# Patient Record
Sex: FEMALE | Race: BLACK OR AFRICAN AMERICAN | NOT HISPANIC OR LATINO | Employment: FULL TIME | URBAN - METROPOLITAN AREA
[De-identification: names, ages, dates, MRNs, and addresses within clinical notes are randomized per-mention and may not be internally consistent; named-entity substitution may affect disease eponyms.]

---

## 2023-04-20 PROBLEM — G89.29 CHRONIC BILATERAL LOW BACK PAIN WITH BILATERAL SCIATICA: Status: ACTIVE | Noted: 2023-04-20

## 2023-04-20 PROBLEM — R79.89 LOW VITAMIN D LEVEL: Status: ACTIVE | Noted: 2023-04-20

## 2023-04-20 PROBLEM — R73.03 PREDIABETES: Status: ACTIVE | Noted: 2023-04-20

## 2023-04-20 PROBLEM — G56.91 NEUROPATHY OF RIGHT UPPER EXTREMITY: Status: ACTIVE | Noted: 2023-04-20

## 2023-04-20 PROBLEM — M54.2 SORE NECK: Status: ACTIVE | Noted: 2023-04-20

## 2023-04-20 PROBLEM — E87.8 ELECTROLYTE ABNORMALITY: Status: ACTIVE | Noted: 2023-04-20

## 2023-04-20 PROBLEM — M54.41 CHRONIC BILATERAL LOW BACK PAIN WITH BILATERAL SCIATICA: Status: ACTIVE | Noted: 2023-04-20

## 2023-04-20 PROBLEM — E88.810 METABOLIC SYNDROME: Status: ACTIVE | Noted: 2023-04-20

## 2023-04-20 PROBLEM — M47.812 DEGENERATIVE ARTHRITIS OF CERVICAL SPINE: Status: ACTIVE | Noted: 2023-04-20

## 2023-04-20 PROBLEM — E78.5 HYPERLIPIDEMIA: Status: ACTIVE | Noted: 2023-04-20

## 2023-04-20 PROBLEM — E66.9 OBESITY: Status: ACTIVE | Noted: 2023-04-20

## 2023-04-20 PROBLEM — J45.909 ASTHMA (HHS-HCC): Status: ACTIVE | Noted: 2023-04-20

## 2023-04-20 PROBLEM — M54.42 CHRONIC BILATERAL LOW BACK PAIN WITH BILATERAL SCIATICA: Status: ACTIVE | Noted: 2023-04-20

## 2023-04-20 PROBLEM — R63.2 POLYPHAGIA: Status: ACTIVE | Noted: 2023-04-20

## 2023-04-20 PROBLEM — E53.8 B12 DEFICIENCY: Status: ACTIVE | Noted: 2023-04-20

## 2023-04-20 PROBLEM — L85.3 DRY SKIN: Status: ACTIVE | Noted: 2023-04-20

## 2023-04-20 PROBLEM — R53.83 FATIGUE: Status: ACTIVE | Noted: 2023-04-20

## 2023-04-20 PROBLEM — M47.816 SPONDYLOSIS OF LUMBAR JOINT: Status: ACTIVE | Noted: 2023-04-20

## 2023-04-20 PROBLEM — M43.02 SPONDYLOLYSIS OF CERVICAL REGION: Status: ACTIVE | Noted: 2023-04-20

## 2023-04-20 PROBLEM — M19.011 OSTEOARTHRITIS OF RIGHT SHOULDER: Status: ACTIVE | Noted: 2023-04-20

## 2023-04-20 PROBLEM — F17.210 CIGARETTE NICOTINE DEPENDENCE WITHOUT COMPLICATION: Status: ACTIVE | Noted: 2023-04-20

## 2023-04-20 RX ORDER — NICOTINE POLACRILEX 2 MG
1 GUM BUCCAL
COMMUNITY
End: 2023-06-07 | Stop reason: ALTCHOICE

## 2023-04-20 RX ORDER — ACETAMINOPHEN 500 MG
1 TABLET ORAL
COMMUNITY
Start: 2020-10-01 | End: 2023-06-07 | Stop reason: SDUPTHER

## 2023-04-20 RX ORDER — ALBUTEROL SULFATE 90 UG/1
1 AEROSOL, METERED RESPIRATORY (INHALATION) EVERY 4 HOURS PRN
COMMUNITY
Start: 2020-09-29 | End: 2023-06-07 | Stop reason: SDUPTHER

## 2023-04-20 RX ORDER — TOPIRAMATE 25 MG/1
25 TABLET ORAL
COMMUNITY
Start: 2021-10-19 | End: 2023-06-07 | Stop reason: ALTCHOICE

## 2023-04-20 RX ORDER — FLUTICASONE PROPIONATE 44 UG/1
1 AEROSOL, METERED RESPIRATORY (INHALATION)
COMMUNITY
Start: 2020-10-14 | End: 2023-06-07 | Stop reason: SDUPTHER

## 2023-04-20 RX ORDER — NAPROXEN SODIUM 220 MG/1
81 TABLET, FILM COATED ORAL
COMMUNITY
Start: 2020-09-16

## 2023-04-20 RX ORDER — METFORMIN HYDROCHLORIDE 500 MG/1
500 TABLET, EXTENDED RELEASE ORAL DAILY
COMMUNITY
Start: 2021-10-20 | End: 2023-06-07 | Stop reason: ALTCHOICE

## 2023-06-07 ENCOUNTER — OFFICE VISIT (OUTPATIENT)
Dept: PRIMARY CARE | Facility: CLINIC | Age: 53
End: 2023-06-07
Payer: MEDICAID

## 2023-06-07 VITALS — DIASTOLIC BLOOD PRESSURE: 98 MMHG | SYSTOLIC BLOOD PRESSURE: 130 MMHG | WEIGHT: 256 LBS | BODY MASS INDEX: 43.26 KG/M2

## 2023-06-07 DIAGNOSIS — T78.40XD ALLERGY, SUBSEQUENT ENCOUNTER: ICD-10-CM

## 2023-06-07 DIAGNOSIS — E66.9 OBESITY, UNSPECIFIED CLASSIFICATION, UNSPECIFIED OBESITY TYPE, UNSPECIFIED WHETHER SERIOUS COMORBIDITY PRESENT: ICD-10-CM

## 2023-06-07 DIAGNOSIS — E55.9 VITAMIN D DEFICIENCY: ICD-10-CM

## 2023-06-07 DIAGNOSIS — Z12.4 CERVICAL CANCER SCREENING: ICD-10-CM

## 2023-06-07 DIAGNOSIS — J45.909 ASTHMA, UNSPECIFIED ASTHMA SEVERITY, UNSPECIFIED WHETHER COMPLICATED, UNSPECIFIED WHETHER PERSISTENT (HHS-HCC): ICD-10-CM

## 2023-06-07 DIAGNOSIS — F17.210 CIGARETTE NICOTINE DEPENDENCE WITHOUT COMPLICATION: Primary | ICD-10-CM

## 2023-06-07 DIAGNOSIS — R73.03 PREDIABETES: ICD-10-CM

## 2023-06-07 PROCEDURE — 99214 OFFICE O/P EST MOD 30 MIN: CPT | Performed by: INTERNAL MEDICINE

## 2023-06-07 RX ORDER — FLUTICASONE PROPIONATE 44 UG/1
2 AEROSOL, METERED RESPIRATORY (INHALATION)
Qty: 10.6 G | Refills: 3 | Status: SHIPPED | OUTPATIENT
Start: 2023-06-07 | End: 2023-10-10 | Stop reason: SDUPTHER

## 2023-06-07 RX ORDER — ACETAMINOPHEN 500 MG
5000 TABLET ORAL DAILY
Qty: 30 TABLET | Refills: 3 | Status: SHIPPED | OUTPATIENT
Start: 2023-06-07

## 2023-06-07 RX ORDER — VARENICLINE TARTRATE 0.5 MG/1
0.5 TABLET, FILM COATED ORAL 2 TIMES DAILY
Qty: 60 TABLET | Refills: 2 | Status: SHIPPED | OUTPATIENT
Start: 2023-06-07 | End: 2023-10-09 | Stop reason: SDUPTHER

## 2023-06-07 RX ORDER — ALBUTEROL SULFATE 90 UG/1
1 AEROSOL, METERED RESPIRATORY (INHALATION) EVERY 4 HOURS PRN
Qty: 18 G | Refills: 3 | Status: SHIPPED | OUTPATIENT
Start: 2023-06-07 | End: 2023-10-10 | Stop reason: SDUPTHER

## 2023-06-07 ASSESSMENT — ENCOUNTER SYMPTOMS
NAUSEA: 0
DIFFICULTY URINATING: 0
ACTIVITY CHANGE: 0
SHORTNESS OF BREATH: 0
WOUND: 0
SLEEP DISTURBANCE: 0
NECK STIFFNESS: 0
PALPITATIONS: 0
VOMITING: 0
COUGH: 0
APPETITE CHANGE: 0
NECK PAIN: 0
CHEST TIGHTNESS: 0
BACK PAIN: 0
FLANK PAIN: 0
FATIGUE: 0
NERVOUS/ANXIOUS: 0
WHEEZING: 0
BLOOD IN STOOL: 0
MYALGIAS: 0
DIAPHORESIS: 0
HEADACHES: 0
DIZZINESS: 0
CHILLS: 0
APNEA: 0
WEAKNESS: 0
TREMORS: 0
DYSURIA: 0
ARTHRALGIAS: 0
HEMATURIA: 0
FEVER: 0
ABDOMINAL DISTENTION: 0
FREQUENCY: 0
JOINT SWELLING: 0
CONSTIPATION: 0
ABDOMINAL PAIN: 0
UNEXPECTED WEIGHT CHANGE: 0
DIARRHEA: 0

## 2023-06-07 ASSESSMENT — PATIENT HEALTH QUESTIONNAIRE - PHQ9
SUM OF ALL RESPONSES TO PHQ9 QUESTIONS 1 AND 2: 0
2. FEELING DOWN, DEPRESSED OR HOPELESS: NOT AT ALL
1. LITTLE INTEREST OR PLEASURE IN DOING THINGS: NOT AT ALL

## 2023-06-07 NOTE — PROGRESS NOTES
Subjective   Patient ID: Violet Torrez is a 53 y.o. female who presents for Follow-up.  Violet Torrez is a 52 yo F with PMH of Pre-Diabetes, HLD, chronic smoking, and Asthma came to the clinic for a routine visit and medication refills.  Patient feels well overall but really wants to loose wait as it seems like its hard. She was previously on topiramate for apetite suppresiion and metformin but could not tolerate. She is trying to exercise and have a healthy diet. She is also trying to quit smoking. She did hear about Ozempic and trulicity as they been helping with weight loss and have contacted her insurance and they said for trulicity she needs pre authorization but not for ozempic.    Patient denies fever, chills, recent illness, CP, SOB, palpitations, abdominal pain, N/V, diarrhea, melena, hematochezia, dysuria, hematuria, or leg swelling.             Review of Systems   Constitutional:  Negative for activity change, appetite change, chills, diaphoresis, fatigue, fever and unexpected weight change.   Eyes:  Negative for visual disturbance.   Respiratory:  Negative for apnea, cough, chest tightness, shortness of breath and wheezing.    Cardiovascular:  Negative for chest pain, palpitations and leg swelling.   Gastrointestinal:  Negative for abdominal distention, abdominal pain, blood in stool, constipation, diarrhea, nausea and vomiting.   Endocrine: Negative for cold intolerance and heat intolerance.   Genitourinary:  Negative for decreased urine volume, difficulty urinating, dysuria, enuresis, flank pain, frequency, hematuria and urgency.   Musculoskeletal:  Negative for arthralgias, back pain, gait problem, joint swelling, myalgias, neck pain and neck stiffness.   Skin:  Negative for rash and wound.   Neurological:  Negative for dizziness, tremors, syncope, weakness and headaches.   Psychiatric/Behavioral:  Negative for behavioral problems and sleep disturbance. The patient is not nervous/anxious.         Objective   Physical Exam  Constitutional:       General: She is not in acute distress.     Appearance: She is obese. She is not ill-appearing, toxic-appearing or diaphoretic.   HENT:      Head: Normocephalic and atraumatic.      Right Ear: There is no impacted cerumen.      Left Ear: There is no impacted cerumen.      Nose: Nose normal. No congestion or rhinorrhea.      Mouth/Throat:      Mouth: Mucous membranes are moist.      Pharynx: No oropharyngeal exudate or posterior oropharyngeal erythema.   Eyes:      General:         Right eye: No discharge.         Left eye: No discharge.      Extraocular Movements: Extraocular movements intact.      Conjunctiva/sclera: Conjunctivae normal.      Pupils: Pupils are equal, round, and reactive to light.   Neck:      Vascular: No carotid bruit.   Cardiovascular:      Rate and Rhythm: Normal rate and regular rhythm.      Heart sounds: No murmur heard.     No friction rub. No gallop.   Pulmonary:      Effort: No respiratory distress.      Breath sounds: No stridor. No wheezing, rhonchi or rales.   Chest:      Chest wall: No tenderness.   Abdominal:      General: Abdomen is flat. Bowel sounds are normal. There is no distension.      Palpations: Abdomen is soft. There is no mass.      Tenderness: There is no abdominal tenderness. There is no guarding or rebound.      Hernia: No hernia is present.   Musculoskeletal:         General: No swelling, tenderness, deformity or signs of injury. Normal range of motion.      Cervical back: Normal range of motion and neck supple. No rigidity or tenderness.      Right lower leg: No edema.      Left lower leg: No edema.   Lymphadenopathy:      Cervical: No cervical adenopathy.   Skin:     Coloration: Skin is not jaundiced or pale.      Findings: No bruising, erythema, lesion or rash.   Neurological:      General: No focal deficit present.      Mental Status: She is oriented to person, place, and time. Mental status is at baseline.       Cranial Nerves: No cranial nerve deficit.      Sensory: No sensory deficit.      Motor: No weakness.      Coordination: Coordination normal.      Gait: Gait normal.      Deep Tendon Reflexes: Reflexes normal.   Psychiatric:         Mood and Affect: Mood normal.         Behavior: Behavior normal.         Thought Content: Thought content normal.         Judgment: Judgment normal.         Assessment/Plan   Problem List Items Addressed This Visit          Respiratory    Asthma    Relevant Medications    fluticasone (Flovent) 44 mcg/actuation inhaler    albuterol 90 mcg/actuation inhaler       Endocrine/Metabolic    Obesity    Relevant Medications    semaglutide 0.25 mg or 0.5 mg (2 mg/3 mL) pen injector    Other Relevant Orders    Hemoglobin A1C    Referral to Nutrition Services    Referral to Comprehensive Weight Loss    Prediabetes    Relevant Medications    semaglutide 0.25 mg or 0.5 mg (2 mg/3 mL) pen injector       Other    Cigarette nicotine dependence without complication - Primary    Relevant Medications    varenicline (Chantix) 0.5 mg tablet     Other Visit Diagnoses       Allergy, subsequent encounter        Relevant Medications    naphazoline (Clear Eyes) 0.012-0.25 % ophthalmic solution    Vitamin D deficiency        Relevant Medications    cholecalciferol (Vitamin D-3) 5,000 Units tablet    Cervical cancer screening        Relevant Orders    Referral to Obstetrics / Gynecology            Violet Atkinsonod is a 54 yo F with PMH of Pre-Diabetes, HLD, chronic smoking, and Asthma came to the clinic for a routine visit and medication refills.    #Elevated BP   -BP today 130/98  -Advised to check BP at home and let us know   -Advised on weight loss / diet exercise / smoking cessation  -Will follow and monitor     #Pre-diabetes  -Last HbA1C 6, will repeat today  -Metformin discontinued / patient could not tolerate  -Ozempic 0.5mg once a week if patient insurance covered      #HLD  -Continue atorvastatin 80 mg  daily    #Asthma  -Well controlled   -Continue Ventolin and Flovent    #Obesity  -Weight loss program referral and nutrition referral today  -Advised on diet and exercise with additional marlonn brochures and literature   -Start Ozempic 0.5mg weekly if covered   -Was previoulsy on metformin and topiramate but could not tolerate     #Chronic smoker   -Patient smokes 6 cig per day   -Used to smoke 1pack a day ( 15 years )  -Rx Chantix as the patient is willing to quit       #Health Maintenance  -LABS completed 02/2023   -Continue Vitamin D3 5000U QD  -Cologuard: Negative on 02/23   -Mammogram: UTD, repeat in 09/2023   -Pap smear: OBGYN referral   -Immunization: ALL due except Tdap (2017), recommended vaccination to patient     RTC in 3 months

## 2023-06-07 NOTE — PROGRESS NOTES
I reviewed with the resident the medical history and the resident’s findings on physical examination.  I discussed with the resident the patient’s diagnosis and concur with the treatment plan as documented in the resident note.     I saw and evaluated the patient. I personally obtained the key and critical portions of the history and physical exam or was physically present for key and critical portions performed by the trainee. I reviewed the trainee's documentation and discussed the patient with the trainee. I agree with the trainee's medical decision making, as documented on the trainee's note.     Michelle Miramontes MD

## 2023-06-09 ENCOUNTER — APPOINTMENT (OUTPATIENT)
Dept: PRIMARY CARE | Facility: CLINIC | Age: 53
End: 2023-06-09
Payer: MEDICAID

## 2023-07-13 ENCOUNTER — LAB (OUTPATIENT)
Dept: LAB | Facility: LAB | Age: 53
End: 2023-07-13
Payer: MEDICAID

## 2023-07-13 DIAGNOSIS — E66.9 OBESITY, UNSPECIFIED CLASSIFICATION, UNSPECIFIED OBESITY TYPE, UNSPECIFIED WHETHER SERIOUS COMORBIDITY PRESENT: ICD-10-CM

## 2023-07-13 PROCEDURE — 36415 COLL VENOUS BLD VENIPUNCTURE: CPT

## 2023-07-13 PROCEDURE — 83036 HEMOGLOBIN GLYCOSYLATED A1C: CPT

## 2023-07-14 LAB
ESTIMATED AVERAGE GLUCOSE FOR HBA1C: 120 MG/DL
HEMOGLOBIN A1C/HEMOGLOBIN TOTAL IN BLOOD: 5.8 %

## 2023-09-18 ENCOUNTER — TELEMEDICINE (OUTPATIENT)
Dept: PRIMARY CARE | Facility: CLINIC | Age: 53
End: 2023-09-18
Payer: MEDICAID

## 2023-09-18 DIAGNOSIS — U07.1 COVID-19: Primary | ICD-10-CM

## 2023-09-18 PROCEDURE — 99214 OFFICE O/P EST MOD 30 MIN: CPT | Performed by: INTERNAL MEDICINE

## 2023-09-18 RX ORDER — BENZONATATE 100 MG/1
100 CAPSULE ORAL 3 TIMES DAILY PRN
Qty: 42 CAPSULE | Refills: 0 | Status: SHIPPED | OUTPATIENT
Start: 2023-09-18 | End: 2023-10-18

## 2023-09-18 RX ORDER — AZITHROMYCIN 250 MG/1
TABLET, FILM COATED ORAL
Qty: 6 TABLET | Refills: 0 | Status: SHIPPED | OUTPATIENT
Start: 2023-09-18 | End: 2023-09-23

## 2023-09-18 RX ORDER — PREDNISONE 20 MG/1
40 TABLET ORAL DAILY
Qty: 10 TABLET | Refills: 0 | Status: SHIPPED | OUTPATIENT
Start: 2023-09-18 | End: 2023-09-23

## 2023-09-18 ASSESSMENT — ENCOUNTER SYMPTOMS
SHORTNESS OF BREATH: 1
CHILLS: 0
FEVER: 0
FACIAL SWELLING: 1
PALPITATIONS: 0
WHEEZING: 0
COUGH: 1

## 2023-09-18 NOTE — PROGRESS NOTES
Subjective   Patient ID: Violet Torrez is a 53 y.o. female who presents for Covid-19 Home Monitoring Visit.  HPI    Review of Systems   Constitutional:  Negative for chills and fever.   HENT:  Positive for congestion and facial swelling.    Respiratory:  Positive for cough and shortness of breath. Negative for wheezing.    Cardiovascular:  Negative for chest pain, palpitations and leg swelling.       Objective   Physical Exam  Neurological:      Mental Status: She is alert.   Psychiatric:         Mood and Affect: Mood normal.         Assessment/Plan   Problem List Items Addressed This Visit       COVID-19 - Primary    Relevant Medications    azithromycin (Zithromax) 250 mg tablet    benzonatate (Tessalon) 100 mg capsule    predniSONE (Deltasone) 20 mg tablet        COVID infection  Has moderate cough, no sputum production.  No alarming symptoms.  Supportive care with fluids, vitamins and cough medications.  Start Zithromax, likely bacterial superinfection  Start Tessalon pearls for cough  If SOB is not better in 48hrs, add Prednisone    OK to RTW when fever free without medications for 24 hrs. Use mask at work if still having cough and congestion.  Let us know if symptoms are not better in 1 week.  Go to ER with any alarming symptoms, such as chest pain, SOB, dizziness, high fever.    An interactive audio and video telecommunication system which permits real time communications between the patient (at the originating site) and provider (at the distant site) was utilized to provide this telehealth service.    Verbal consent was requested and obtained from the patient on the day of encounter.    Michelle Miramontes MD

## 2023-10-09 ENCOUNTER — OFFICE VISIT (OUTPATIENT)
Dept: PRIMARY CARE | Facility: CLINIC | Age: 53
End: 2023-10-09
Payer: MEDICAID

## 2023-10-09 VITALS — SYSTOLIC BLOOD PRESSURE: 133 MMHG | DIASTOLIC BLOOD PRESSURE: 87 MMHG | BODY MASS INDEX: 42.59 KG/M2 | WEIGHT: 252 LBS

## 2023-10-09 DIAGNOSIS — R35.0 URINARY FREQUENCY: ICD-10-CM

## 2023-10-09 DIAGNOSIS — Z12.31 BREAST CANCER SCREENING BY MAMMOGRAM: ICD-10-CM

## 2023-10-09 DIAGNOSIS — E66.01 CLASS 3 SEVERE OBESITY WITHOUT SERIOUS COMORBIDITY WITH BODY MASS INDEX (BMI) OF 40.0 TO 44.9 IN ADULT, UNSPECIFIED OBESITY TYPE (MULTI): ICD-10-CM

## 2023-10-09 DIAGNOSIS — E78.5 HYPERLIPIDEMIA, UNSPECIFIED HYPERLIPIDEMIA TYPE: Primary | ICD-10-CM

## 2023-10-09 DIAGNOSIS — F17.210 CIGARETTE NICOTINE DEPENDENCE WITHOUT COMPLICATION: ICD-10-CM

## 2023-10-09 DIAGNOSIS — J45.909 ASTHMA, UNSPECIFIED ASTHMA SEVERITY, UNSPECIFIED WHETHER COMPLICATED, UNSPECIFIED WHETHER PERSISTENT (HHS-HCC): ICD-10-CM

## 2023-10-09 DIAGNOSIS — R05.2 SUBACUTE COUGH: ICD-10-CM

## 2023-10-09 DIAGNOSIS — R79.89 LOW VITAMIN D LEVEL: ICD-10-CM

## 2023-10-09 DIAGNOSIS — L72.0 EPIDERMAL CYST OF FACE: ICD-10-CM

## 2023-10-09 LAB
POC APPEARANCE, URINE: CLEAR
POC BILIRUBIN, URINE: NEGATIVE
POC BLOOD, URINE: NEGATIVE
POC COLOR, URINE: YELLOW
POC GLUCOSE, URINE: NEGATIVE MG/DL
POC KETONES, URINE: NEGATIVE MG/DL
POC LEUKOCYTES, URINE: NEGATIVE
POC NITRITE,URINE: NEGATIVE
POC PH, URINE: 6 PH
POC PROTEIN, URINE: NEGATIVE MG/DL
POC SPECIFIC GRAVITY, URINE: 1.01
POC UROBILINOGEN, URINE: 0.2 EU/DL

## 2023-10-09 PROCEDURE — 81002 URINALYSIS NONAUTO W/O SCOPE: CPT | Performed by: INTERNAL MEDICINE

## 2023-10-09 PROCEDURE — 99214 OFFICE O/P EST MOD 30 MIN: CPT | Performed by: INTERNAL MEDICINE

## 2023-10-09 PROCEDURE — 99396 PREV VISIT EST AGE 40-64: CPT | Performed by: INTERNAL MEDICINE

## 2023-10-09 PROCEDURE — 3008F BODY MASS INDEX DOCD: CPT | Performed by: INTERNAL MEDICINE

## 2023-10-09 RX ORDER — PREDNISONE 20 MG/1
20 TABLET ORAL 2 TIMES DAILY
Qty: 10 TABLET | Refills: 0 | Status: SHIPPED | OUTPATIENT
Start: 2023-10-09 | End: 2023-10-14

## 2023-10-09 RX ORDER — ERGOCALCIFEROL 1.25 MG/1
50000 CAPSULE ORAL
Qty: 4 CAPSULE | Refills: 1 | Status: SHIPPED | OUTPATIENT
Start: 2023-10-09 | End: 2023-12-04

## 2023-10-09 RX ORDER — ATORVASTATIN CALCIUM 40 MG/1
40 TABLET, FILM COATED ORAL DAILY
Qty: 30 TABLET | Refills: 5 | Status: SHIPPED | OUTPATIENT
Start: 2023-10-09 | End: 2024-04-06

## 2023-10-09 RX ORDER — VARENICLINE TARTRATE 0.5 MG/1
0.5 TABLET, FILM COATED ORAL 2 TIMES DAILY
Qty: 60 TABLET | Refills: 2 | Status: SHIPPED | OUTPATIENT
Start: 2023-10-09 | End: 2024-01-07

## 2023-10-09 NOTE — PROGRESS NOTES
I saw and evaluated the patient. I personally obtained the key and critical portions of the history and physical exam or was physically present for key and critical portions performed by the resident/fellow. I reviewed the resident/fellow's documentation and discussed the patient with the resident/fellow. I agree with the resident/fellow's medical decision making as documented in the note.    Michelle Miramontes MD

## 2023-10-09 NOTE — PROGRESS NOTES
Subjective   Patient ID: Violet Torrez is a 53 y.o. female who presents for No chief complaint on file..  Violet Torrez is a 52 yo F with PMH of Pre-Diabetes, HLD, chronic smoking, and Asthma came to the clinic for a routine visit and medication  here for CPE   last seen 6/23, had covid last month on 9/18. took azithromax but did not take steroids    still has a cough, mild clear sputum productions, dry/eyes mouth, ear fullness   sometimes has shortnessof breath, no chest pain or palpitations  says is eating and drinking okay  has urinary frequency         Review of Systems   All other systems reviewed and are negative.      Objective   Physical Exam  Vitals reviewed.   Constitutional:       Appearance: Normal appearance.   Cardiovascular:      Rate and Rhythm: Normal rate and regular rhythm.   Pulmonary:      Effort: Pulmonary effort is normal.      Breath sounds: Normal breath sounds.   Abdominal:      General: Abdomen is flat. Bowel sounds are normal.      Palpations: Abdomen is soft.   Neurological:      Mental Status: She is alert.   Psychiatric:         Mood and Affect: Mood normal.         Behavior: Behavior normal.       /87   Wt 114 kg (252 lb)   BMI 42.59 kg/m²      Assessment/Plan   Problem List Items Addressed This Visit       Asthma    Cigarette nicotine dependence without complication    Hyperlipidemia - Primary    Low vitamin D level    Obesity     Other Visit Diagnoses       Urinary frequency        UA clean   follow up on sxs on OBGYN referral for pap    Subacute cough        likely related acute bronchitis sp recent COVID   start predisone 40mg x 5days   tessalon pearls for sxs    Epidermal cyst of face        referal to derm    Breast cancer screening by mammogram              #Health Maintenance  -LABS UTD   -Continue Vitamin D3 5000U QD  -ASCVD 6%  -low dose CT ordered   -Cologuard: Negative on 02/23   -Mammogram: ordered today   -Pap smear: OBGYN referral given last visit     -Immunization: needs shingles vaccine     RTC in 4 months

## 2023-10-10 RX ORDER — ALBUTEROL SULFATE 90 UG/1
1 AEROSOL, METERED RESPIRATORY (INHALATION) EVERY 4 HOURS PRN
Qty: 18 G | Refills: 3 | Status: SHIPPED | OUTPATIENT
Start: 2023-10-10

## 2023-10-10 RX ORDER — FLUTICASONE PROPIONATE 44 UG/1
2 AEROSOL, METERED RESPIRATORY (INHALATION)
Qty: 10.6 G | Refills: 3 | Status: SHIPPED | OUTPATIENT
Start: 2023-10-10

## 2023-10-13 ENCOUNTER — APPOINTMENT (OUTPATIENT)
Dept: RADIOLOGY | Facility: CLINIC | Age: 53
End: 2023-10-13
Payer: MEDICAID

## 2023-10-23 NOTE — PROGRESS NOTES
Subjective   Patient ID: Violet Torrez is a 53 y.o. female referral from PCP Dr. Michelle Fox.    HPI: Patient is a 53 year old with past medical history of morbid obesity, HLD, asthma, chronic smoking and prediabetes. Patient is presenting with a lymp on her forehead just above her left eyebrow. Patient denies pain to the area but states she is very uncomfortable with the way it looks. States she would like it removed as soon as possible. States she has had a partial hysterectomy and healed well without excess scarring. Denies constitutional symptoms.     Surgical History: partial hysterectomy  Social History: admits to smoking but is trying to quit.    Review of Systems  10 point review of systems negative except as stated in HPI.     Objective   Physical Exam      Sightly mobile, painless mass located just superior to medial aspect of left eyebrow. Mass does not have a head and is not located on a skin line. No erythema, no drainage, no calor, no malodor, no ascending lymphangitis.     Assessment/Plan   Diagnoses and all orders for this visit:  Class 3 severe obesity without serious comorbidity with body mass index (BMI) of 40.0 to 44.9 in adult, unspecified obesity type (CMS/HCC)  Cigarette nicotine dependence without complication  Epidermal cyst of face  Patient with lipoma vs epidermal cyst to forehead.   Discussed potential risks of removal of cyst including scarring, indentation of the skin, potential for the are to look worse after removal.   Will discuss case with Dr. Delaney and contact patient within one week to discuss treatment options.   Follow up after discussing case with Dr. Delaney.

## 2023-10-26 ENCOUNTER — OFFICE VISIT (OUTPATIENT)
Dept: PLASTIC SURGERY | Facility: CLINIC | Age: 53
End: 2023-10-26
Payer: MEDICAID

## 2023-10-26 VITALS — BODY MASS INDEX: 41.52 KG/M2 | HEIGHT: 65 IN | WEIGHT: 249.2 LBS

## 2023-10-26 DIAGNOSIS — L72.0 EPIDERMAL CYST OF FACE: ICD-10-CM

## 2023-10-26 DIAGNOSIS — F17.210 CIGARETTE NICOTINE DEPENDENCE WITHOUT COMPLICATION: ICD-10-CM

## 2023-10-26 DIAGNOSIS — L72.0 EPIDERMAL CYST OF FACE: Primary | ICD-10-CM

## 2023-10-26 DIAGNOSIS — E66.01 CLASS 3 SEVERE OBESITY WITHOUT SERIOUS COMORBIDITY WITH BODY MASS INDEX (BMI) OF 40.0 TO 44.9 IN ADULT, UNSPECIFIED OBESITY TYPE (MULTI): ICD-10-CM

## 2023-10-26 PROCEDURE — 99203 OFFICE O/P NEW LOW 30 MIN: CPT | Performed by: PHYSICIAN ASSISTANT

## 2023-10-26 PROCEDURE — 3008F BODY MASS INDEX DOCD: CPT | Performed by: PHYSICIAN ASSISTANT

## 2023-11-20 ENCOUNTER — APPOINTMENT (OUTPATIENT)
Dept: SURGERY | Facility: CLINIC | Age: 53
End: 2023-11-20
Payer: MEDICAID

## 2023-12-03 ENCOUNTER — APPOINTMENT (OUTPATIENT)
Dept: RADIOLOGY | Facility: HOSPITAL | Age: 53
End: 2023-12-03
Payer: MEDICAID

## 2023-12-03 ENCOUNTER — HOSPITAL ENCOUNTER (EMERGENCY)
Facility: HOSPITAL | Age: 53
Discharge: HOME | End: 2023-12-03
Payer: MEDICAID

## 2023-12-03 VITALS
WEIGHT: 250 LBS | OXYGEN SATURATION: 99 % | BODY MASS INDEX: 41.65 KG/M2 | SYSTOLIC BLOOD PRESSURE: 148 MMHG | HEIGHT: 65 IN | TEMPERATURE: 97.8 F | HEART RATE: 72 BPM | RESPIRATION RATE: 18 BRPM | DIASTOLIC BLOOD PRESSURE: 85 MMHG

## 2023-12-03 DIAGNOSIS — M25.512 ACUTE PAIN OF LEFT SHOULDER: Primary | ICD-10-CM

## 2023-12-03 DIAGNOSIS — R03.0 ELEVATED BLOOD PRESSURE READING: ICD-10-CM

## 2023-12-03 LAB
ALBUMIN SERPL BCP-MCNC: 4 G/DL (ref 3.4–5)
ALP SERPL-CCNC: 88 U/L (ref 33–110)
ALT SERPL W P-5'-P-CCNC: 28 U/L (ref 7–45)
ANION GAP SERPL CALC-SCNC: 12 MMOL/L (ref 10–20)
AST SERPL W P-5'-P-CCNC: 25 U/L (ref 9–39)
BASOPHILS # BLD AUTO: 0.02 X10*3/UL (ref 0–0.1)
BASOPHILS NFR BLD AUTO: 0.3 %
BILIRUB SERPL-MCNC: 0.7 MG/DL (ref 0–1.2)
BUN SERPL-MCNC: 11 MG/DL (ref 6–23)
CALCIUM SERPL-MCNC: 8.7 MG/DL (ref 8.6–10.3)
CARDIAC TROPONIN I PNL SERPL HS: <3 NG/L (ref 0–13)
CHLORIDE SERPL-SCNC: 103 MMOL/L (ref 98–107)
CO2 SERPL-SCNC: 26 MMOL/L (ref 21–32)
CREAT SERPL-MCNC: 0.81 MG/DL (ref 0.5–1.05)
EOSINOPHIL # BLD AUTO: 0.15 X10*3/UL (ref 0–0.7)
EOSINOPHIL NFR BLD AUTO: 2.4 %
ERYTHROCYTE [DISTWIDTH] IN BLOOD BY AUTOMATED COUNT: 13.8 % (ref 11.5–14.5)
GFR SERPL CREATININE-BSD FRML MDRD: 88 ML/MIN/1.73M*2
GLUCOSE SERPL-MCNC: 94 MG/DL (ref 74–99)
HCT VFR BLD AUTO: 38.9 % (ref 36–46)
HGB BLD-MCNC: 13.1 G/DL (ref 12–16)
IMM GRANULOCYTES # BLD AUTO: 0.02 X10*3/UL (ref 0–0.7)
IMM GRANULOCYTES NFR BLD AUTO: 0.3 % (ref 0–0.9)
LYMPHOCYTES # BLD AUTO: 1.79 X10*3/UL (ref 1.2–4.8)
LYMPHOCYTES NFR BLD AUTO: 29 %
MCH RBC QN AUTO: 27.5 PG (ref 26–34)
MCHC RBC AUTO-ENTMCNC: 33.7 G/DL (ref 32–36)
MCV RBC AUTO: 82 FL (ref 80–100)
MONOCYTES # BLD AUTO: 0.36 X10*3/UL (ref 0.1–1)
MONOCYTES NFR BLD AUTO: 5.8 %
NEUTROPHILS # BLD AUTO: 3.84 X10*3/UL (ref 1.2–7.7)
NEUTROPHILS NFR BLD AUTO: 62.2 %
NRBC BLD-RTO: 0 /100 WBCS (ref 0–0)
PLATELET # BLD AUTO: 229 X10*3/UL (ref 150–450)
POTASSIUM SERPL-SCNC: 4.3 MMOL/L (ref 3.5–5.3)
PROT SERPL-MCNC: 6.8 G/DL (ref 6.4–8.2)
RBC # BLD AUTO: 4.76 X10*6/UL (ref 4–5.2)
SODIUM SERPL-SCNC: 137 MMOL/L (ref 136–145)
WBC # BLD AUTO: 6.2 X10*3/UL (ref 4.4–11.3)

## 2023-12-03 PROCEDURE — 71046 X-RAY EXAM CHEST 2 VIEWS: CPT | Mod: FOREIGN READ | Performed by: RADIOLOGY

## 2023-12-03 PROCEDURE — 36415 COLL VENOUS BLD VENIPUNCTURE: CPT

## 2023-12-03 PROCEDURE — 73030 X-RAY EXAM OF SHOULDER: CPT | Mod: LEFT SIDE | Performed by: RADIOLOGY

## 2023-12-03 PROCEDURE — 2500000004 HC RX 250 GENERAL PHARMACY W/ HCPCS (ALT 636 FOR OP/ED)

## 2023-12-03 PROCEDURE — 85025 COMPLETE CBC W/AUTO DIFF WBC: CPT

## 2023-12-03 PROCEDURE — 73030 X-RAY EXAM OF SHOULDER: CPT | Mod: LT

## 2023-12-03 PROCEDURE — 76882 US LMTD JT/FCL EVL NVASC XTR: CPT | Mod: FOREIGN READ | Performed by: RADIOLOGY

## 2023-12-03 PROCEDURE — 93005 ELECTROCARDIOGRAM TRACING: CPT

## 2023-12-03 PROCEDURE — 96372 THER/PROPH/DIAG INJ SC/IM: CPT

## 2023-12-03 PROCEDURE — 93971 EXTREMITY STUDY: CPT | Mod: FR

## 2023-12-03 PROCEDURE — 71046 X-RAY EXAM CHEST 2 VIEWS: CPT | Mod: FR

## 2023-12-03 PROCEDURE — 80053 COMPREHEN METABOLIC PANEL: CPT

## 2023-12-03 PROCEDURE — 2500000005 HC RX 250 GENERAL PHARMACY W/O HCPCS

## 2023-12-03 PROCEDURE — 99284 EMERGENCY DEPT VISIT MOD MDM: CPT

## 2023-12-03 PROCEDURE — 84484 ASSAY OF TROPONIN QUANT: CPT

## 2023-12-03 PROCEDURE — 99285 EMERGENCY DEPT VISIT HI MDM: CPT | Mod: 25

## 2023-12-03 RX ORDER — KETOROLAC TROMETHAMINE 30 MG/ML
30 INJECTION, SOLUTION INTRAMUSCULAR; INTRAVENOUS ONCE
Status: COMPLETED | OUTPATIENT
Start: 2023-12-03 | End: 2023-12-03

## 2023-12-03 RX ORDER — LIDOCAINE 50 MG/G
1 PATCH TOPICAL DAILY
Qty: 5 PATCH | Refills: 0 | Status: SHIPPED | OUTPATIENT
Start: 2023-12-03 | End: 2023-12-08

## 2023-12-03 RX ORDER — BACLOFEN 10 MG/1
10 TABLET ORAL 2 TIMES DAILY
Qty: 6 TABLET | Refills: 0 | Status: SHIPPED | OUTPATIENT
Start: 2023-12-03 | End: 2023-12-06

## 2023-12-03 RX ORDER — ACETAMINOPHEN 500 MG
1000 TABLET ORAL EVERY 8 HOURS PRN
Qty: 18 TABLET | Refills: 0 | Status: SHIPPED | OUTPATIENT
Start: 2023-12-03 | End: 2023-12-06

## 2023-12-03 RX ORDER — NAPROXEN 500 MG/1
500 TABLET ORAL EVERY 12 HOURS
Qty: 6 TABLET | Refills: 0 | Status: SHIPPED | OUTPATIENT
Start: 2023-12-03 | End: 2023-12-06

## 2023-12-03 RX ORDER — ACETAMINOPHEN 325 MG/1
975 TABLET ORAL ONCE
Status: COMPLETED | OUTPATIENT
Start: 2023-12-03 | End: 2023-12-03

## 2023-12-03 RX ORDER — LIDOCAINE 560 MG/1
1 PATCH PERCUTANEOUS; TOPICAL; TRANSDERMAL DAILY
Status: DISCONTINUED | OUTPATIENT
Start: 2023-12-03 | End: 2023-12-03 | Stop reason: HOSPADM

## 2023-12-03 RX ADMIN — ACETAMINOPHEN 975 MG: 325 TABLET ORAL at 11:15

## 2023-12-03 RX ADMIN — LIDOCAINE 1 PATCH: 4 PATCH TOPICAL at 11:15

## 2023-12-03 RX ADMIN — KETOROLAC TROMETHAMINE 30 MG: 30 INJECTION, SOLUTION INTRAMUSCULAR at 11:15

## 2023-12-03 ASSESSMENT — COLUMBIA-SUICIDE SEVERITY RATING SCALE - C-SSRS
1. IN THE PAST MONTH, HAVE YOU WISHED YOU WERE DEAD OR WISHED YOU COULD GO TO SLEEP AND NOT WAKE UP?: NO
4. HAVE YOU HAD THESE THOUGHTS AND HAD SOME INTENTION OF ACTING ON THEM?: NO
6. HAVE YOU EVER DONE ANYTHING, STARTED TO DO ANYTHING, OR PREPARED TO DO ANYTHING TO END YOUR LIFE?: NO
2. HAVE YOU ACTUALLY HAD ANY THOUGHTS OF KILLING YOURSELF?: NO
5. HAVE YOU STARTED TO WORK OUT OR WORKED OUT THE DETAILS OF HOW TO KILL YOURSELF? DO YOU INTEND TO CARRY OUT THIS PLAN?: NO

## 2023-12-03 ASSESSMENT — PAIN - FUNCTIONAL ASSESSMENT
PAIN_FUNCTIONAL_ASSESSMENT: 0-10
PAIN_FUNCTIONAL_ASSESSMENT: 0-10

## 2023-12-03 ASSESSMENT — PAIN SCALES - GENERAL
PAINLEVEL_OUTOF10: 10 - WORST POSSIBLE PAIN
PAINLEVEL_OUTOF10: 2

## 2023-12-03 ASSESSMENT — PAIN DESCRIPTION - LOCATION: LOCATION: ARM

## 2023-12-03 ASSESSMENT — PAIN DESCRIPTION - ORIENTATION: ORIENTATION: LEFT

## 2023-12-03 NOTE — ED TRIAGE NOTES
"C/O LEFT ARM PAIN X3 DAYS, DENIES KNOWN INJURY, PT STATES \"I WOKE UP FRIDAY LIKE THIS\" INCREASED PAIN WITH ROM, CIRCULATION INTACT, DENIES CP/SOB/PALPITATIONS/N/V/D/F/CHILLS   "

## 2023-12-03 NOTE — ED PROVIDER NOTES
HPI   Chief Complaint   Patient presents with    Arm Injury     LEFT       53-year-old female with past medical history of HLD, prediabetes, asthma, osteoarthritis of right shoulder presents the ED to chief concern of left shoulder pain.  Patient states symptoms started 3 days ago.  She denies any known trauma or injury to the area.  She states that she fell asleep on her couch Friday night and woke up in a weird position.  She endorses pain in the back of the left neck radiating down into the entire left shoulder.  She does not have any pain in the elbow.  Describes the pain as an aching/throbbing worse with movement.  She denies any fever/chills, nausea/vomiting.  She denies any chest pain or shortness of breath.  Denies anterior neck pain, jaw pain.  The right shoulder is unremarkable.  She states that she did have similar symptoms in the past on her right shoulder and was given a sling that helped.  Patient requests sling today.  She states that she took some Motrin and Tylenol at home.  She is not anticoagulated but states that her primary care doctor has her on aspirin but she has not been taking this.  She is not sure why her primary care doctor gave her this medication.  She denies any abdominal pain.  The pain is not exertional.  States the pain is worse with movement of the left shoulder.  She has no other symptoms or concerns at this time.  Denies any previous heart problems.  She denies any history of IV drug use.  Denies any history of PE or DVT.  Denies any recent travel or surgeries.        History provided by:  Patient   used: No                        [place] Coma Scale Score: 15                  Patient History   No past medical history on file.  Past Surgical History:   Procedure Laterality Date    OTHER SURGICAL HISTORY  09/16/2020    Partial hysterectomy     No family history on file.  Social History     Tobacco Use    Smoking status: Every Day     Types: Cigarettes     Smokeless tobacco: Never   Substance Use Topics    Alcohol use: Never    Drug use: Never       Physical Exam   ED Triage Vitals [12/03/23 0937]   Temp Heart Rate Resp BP   36.6 °C (97.8 °F) 88 17 (!) 156/108      SpO2 Temp src Heart Rate Source Patient Position   95 % -- -- Sitting      BP Location FiO2 (%)     Right arm --       Physical Exam  Constitutional: Vital signs per nursing notes.  Well developed, well nourished.  No acute distress.    Psychiatric: alert and oriented to person, place, and time; no abnormalities of mood or affect; memory intact  Eyes: PERRL; conjunctivae and lids normal; EOMI  ENT: Ears normal externally; face symmetric. voice normal  Neck: neck supple, no meningismus; trachea midline without deviation  Respiratory: normal respiratory effort and excursion; no rales, rhonchi, or wheezes; equal air entry  Cardiovascular: RRR, 2+ pulses extremities   Neurological: normal speech; CN II-XII grossly intact, normal motor and sensory function; no nystagmus; ; no ataxia.  GI: no distention, soft, nontender  : Deferred  Musculoskeletal: normal gait and station; normal digits and nails; no overlying skin changes to the shoulders bilaterally.  Full range of motion of right shoulder without any focal bony tenderness palpation.  Tenderness palpation over posterior aspect of left shoulder.  Decreased range of motion with left shoulder due to pain.  5/5 strength in upper extremities bilaterally.  Sensation intact in upper extremities bilaterally.  Neurovascular status intact.  Compartments are soft.  No cyanosis.  2+ symmetric radial pulses bilaterally.  No pain with passive range of motion of left shoulder.  Left elbow unremarkable.  Pain with active internal rotation, flexion, extension, and abduction of the left shoulder.  Skin: normal to inspection; normal to palpation; no rash    ED Course & MDM   ED Course as of 12/03/23 1325   Sun Dec 03, 2023   1020 Ventricular rate 86 bpm.  ID interval 158 ms.   QRS duration 84 ms.  QT/QTc 382/457 ms.  Patient is in normal sinus rhythm and ventricular rate of 86 bpm.  Normal axis.  There is good R wave progression.  No right or left bundle branch block.  No ST elevations or T wave inversions. No previous EKGs to compare this to. []   1241 IMPRESSION:  No acute process.   [MC]   1241 IMPRESSION:  No acute osseous abnormality.   []   1241 CBC shows no evidence of leukocytosis or anemia.  CMP shows no LYNDON or acute liver injury.  Troponin unremarkable. []   1306 IMPRESSION:  No evidence for DVT within the left upper extremity.   []      ED Course User Index  [] Brayan Christianson PA-C         Diagnoses as of 12/03/23 1325   Acute pain of left shoulder   Elevated blood pressure reading       Medical Decision Making  53-year-old female with past medical history of HLD, prediabetes, asthma, osteoarthritis of right shoulder presents the ED to chief concern of left shoulder pain.  Vital signs are reassuring.  Patient overall appears well and is nontoxic-appearing.  Patient was given Toradol, Tylenol in the ED.  She felt much better after administration of these medications.  Strength is intact in the upper extremities bilaterally.  Neurovascular status is intact.  She has 2+ symmetric radial pulses bilaterally.  No concern for any vascular pathology at this time.  Ultrasound is negative for any DVT.  She is fully neurologically intact with no acute neurological deficits.  The shoulder is not erythematous or indurated.  No warmth.  No signs of cellulitis or lymphangitis.  No signs of septic arthritis or crystal arthropathy.  No pain with passive range of motion of the joint.  She has clear tenderness to palpation over the joint and pain with active range of motion.  The pain is not exertional.  She has no other symptoms associated with the shoulder pain.  She has no chest pain, shortness of breath, neck pain, jaw pain.  X-ray unremarkable.  Lab work is unremarkable.  Troponins  unremarkable.  Her blood pressure improved in the ED.  No concerning findings for aortic dissection at this time.  EKG is unremarkable as well.  Discussed my impression and findings with patient and she feels comfortable returning home.  We discussed very strict return precautions including returning for any new or worsening signs or symptoms.  Patient is in agreement this plan.  She will follow-up with the PCP within 3 days.  Again discussed strict return precautions.  She will also follow-up with orthopedics.  Discharged with analgesics and muscle relaxants.    Differential diagnosis: Strain, sprain, septic arthritis, crystal arthropathy, aortic dissection, ACS, PE, pneumothorax, cellulitis, lymphangitis    Disposition/treatment  1.  Acute pain of left shoulder  2.  Elevated blood pressure reading    Shared decision-making was used patient feels comfortable returning home     Patient was advised to follow up with recommended provider in 1 day1 for another evaluation and exam. I advised patient/guardian to return or go to closest emergency room immediately if symptoms change, get worse, new symptoms develop prior to follow up. If there is no improvement in symptoms in the next 24 hours they are advised to return for further evaluation and exam. I also explained the plan and treatment course. Patient/guardian is in agreement with plan, treatment course, and follow up and states verbally that they will comply.    Homegoing. I discussed the differential; results and discharge plan with the patient and/or family/friend/caregiver if present.  I emphasized the importance of follow-up with the physician I referred them to in the timeframe recommended.  I explained reasons for the patient to return to the Emergency Department. They agreed that if they feel their condition is worsening or if they have any other concern they should call 911 immediately for further assistance. I gave the patient an opportunity to ask all  questions they had and answered all of them accordingly. They understand return precautions and discharge instructions. The patient and/or family/friend/caregiver expressed understanding verbally and that they would comply.        This note has been transcribed using voice recognition and may contain grammatical errors, misplaced words, incorrect words, incorrect phrases or other errors.        Procedure  Procedures     Brayan Christianson PA-C  12/03/23 5200

## 2023-12-04 ENCOUNTER — TELEMEDICINE (OUTPATIENT)
Dept: PRIMARY CARE | Facility: CLINIC | Age: 53
End: 2023-12-04
Payer: MEDICAID

## 2023-12-04 DIAGNOSIS — M25.512 ACUTE PAIN OF LEFT SHOULDER: Primary | ICD-10-CM

## 2023-12-04 PROCEDURE — 99214 OFFICE O/P EST MOD 30 MIN: CPT | Performed by: INTERNAL MEDICINE

## 2023-12-04 RX ORDER — KETOROLAC TROMETHAMINE 10 MG/1
10 TABLET, FILM COATED ORAL EVERY 6 HOURS PRN
Qty: 20 TABLET | Refills: 0 | Status: SHIPPED | OUTPATIENT
Start: 2023-12-04 | End: 2023-12-09

## 2023-12-04 RX ORDER — GABAPENTIN 100 MG/1
100 CAPSULE ORAL 3 TIMES DAILY
Qty: 30 CAPSULE | Refills: 0 | Status: SHIPPED | OUTPATIENT
Start: 2023-12-04 | End: 2024-12-03

## 2023-12-04 NOTE — PROGRESS NOTES
Subjective   Patient ID: Violet Torrez is a 53 y.o. female who presents for Shoulder Pain.  Shoulder Pain   This is a new problem. The current episode started 1 to 4 weeks ago. There has been no history of extremity trauma. The problem occurs constantly. The problem has been gradually worsening.       Review of Systems    Objective   Physical Exam  Neurological:      Mental Status: She is alert.   Psychiatric:         Mood and Affect: Mood normal.         Assessment/Plan   Problem List Items Addressed This Visit    None  Visit Diagnoses       Acute pain of left shoulder    -  Primary    Relevant Medications    ketorolac (Toradol) 10 mg tablet    gabapentin (Neurontin) 100 mg capsule    Other Relevant Orders    MR shoulder left wo IV contrast    Referral to Orthopaedic Surgery          Severe L shoulder pain, which is not improving.  Went to ER, Xrays done, given Tylenol and Ibuprofen.  Pain is really bad and it's limiting her daily activities, she is unable to use her L arm, unable to wipe herself.  She is crying in pain.  She needs to get an MRI of the shoulder and needs to see ortho.  Start Gabapentin 100 mg TID as well.  Try not to use the arm as much, keep it immobilized.  Suspect rotator cuff injury vs tear.    An interactive audio and video telecommunication system which permits real time communications between the patient (at the originating site) and provider (at the distant site) was utilized to provide this telehealth service.    Verbal consent was requested and obtained from the patient on the day of encounter.           Michelle Miramontes MD

## 2023-12-07 LAB
BODY SURFACE AREA: 2.29 M2
BODY SURFACE AREA: 2.29 M2

## 2023-12-19 ENCOUNTER — APPOINTMENT (OUTPATIENT)
Dept: RADIOLOGY | Facility: HOSPITAL | Age: 53
End: 2023-12-19
Payer: MEDICAID

## 2023-12-21 ENCOUNTER — APPOINTMENT (OUTPATIENT)
Dept: ORTHOPEDIC SURGERY | Facility: CLINIC | Age: 53
End: 2023-12-21
Payer: MEDICAID